# Patient Record
Sex: FEMALE | Race: WHITE | NOT HISPANIC OR LATINO | Employment: OTHER | URBAN - METROPOLITAN AREA
[De-identification: names, ages, dates, MRNs, and addresses within clinical notes are randomized per-mention and may not be internally consistent; named-entity substitution may affect disease eponyms.]

---

## 2017-01-01 DIAGNOSIS — Z12.31 ENCOUNTER FOR SCREENING MAMMOGRAM FOR MALIGNANT NEOPLASM OF BREAST: ICD-10-CM

## 2017-01-01 DIAGNOSIS — M79.629 PAIN OF UPPER ARM: ICD-10-CM

## 2017-01-13 ENCOUNTER — ALLSCRIPTS OFFICE VISIT (OUTPATIENT)
Dept: OTHER | Facility: OTHER | Age: 57
End: 2017-01-13

## 2017-01-13 ENCOUNTER — LAB REQUISITION (OUTPATIENT)
Dept: LAB | Facility: HOSPITAL | Age: 57
End: 2017-01-13
Payer: COMMERCIAL

## 2017-01-13 DIAGNOSIS — Z01.419 ENCOUNTER FOR GYNECOLOGICAL EXAMINATION WITHOUT ABNORMAL FINDING: ICD-10-CM

## 2017-01-13 PROCEDURE — 87624 HPV HI-RISK TYP POOLED RSLT: CPT | Performed by: PHYSICIAN ASSISTANT

## 2017-01-13 PROCEDURE — G0145 SCR C/V CYTO,THINLAYER,RESCR: HCPCS | Performed by: PHYSICIAN ASSISTANT

## 2017-01-18 ENCOUNTER — HOSPITAL ENCOUNTER (OUTPATIENT)
Dept: MAMMOGRAPHY | Facility: CLINIC | Age: 57
Discharge: HOME | End: 2017-01-18
Payer: COMMERCIAL

## 2017-01-18 ENCOUNTER — HOSPITAL ENCOUNTER (OUTPATIENT)
Dept: ULTRASOUND IMAGING | Facility: CLINIC | Age: 57
Discharge: HOME | End: 2017-01-18
Payer: COMMERCIAL

## 2017-01-18 DIAGNOSIS — Z12.31 ENCOUNTER FOR SCREENING MAMMOGRAM FOR MALIGNANT NEOPLASM OF BREAST: ICD-10-CM

## 2017-01-18 DIAGNOSIS — N64.4 BREAST PAIN: ICD-10-CM

## 2017-01-18 DIAGNOSIS — M79.629 PAIN OF UPPER ARM: ICD-10-CM

## 2017-01-18 PROCEDURE — G0204 DX MAMMO INCL CAD BI: HCPCS

## 2017-01-18 PROCEDURE — 76642 ULTRASOUND BREAST LIMITED: CPT

## 2017-01-19 LAB
HPV RRNA GENITAL QL NAA+PROBE: NORMAL
LAB AP GYN PRIMARY INTERPRETATION: NORMAL
Lab: NORMAL

## 2017-04-03 ENCOUNTER — LAB REQUISITION (OUTPATIENT)
Dept: LAB | Facility: HOSPITAL | Age: 57
End: 2017-04-03
Payer: COMMERCIAL

## 2017-04-03 DIAGNOSIS — B37.9 CANDIDIASIS: ICD-10-CM

## 2017-04-03 PROCEDURE — 87102 FUNGUS ISOLATION CULTURE: CPT | Performed by: PHYSICIAN ASSISTANT

## 2017-05-08 LAB — FUNGUS SPEC CULT: NORMAL

## 2017-10-19 ENCOUNTER — ALLSCRIPTS OFFICE VISIT (OUTPATIENT)
Dept: OTHER | Facility: OTHER | Age: 57
End: 2017-10-19

## 2017-10-19 DIAGNOSIS — N64.4 MASTODYNIA: ICD-10-CM

## 2017-10-19 DIAGNOSIS — N63.20 MASS OF LEFT BREAST: ICD-10-CM

## 2017-10-23 ENCOUNTER — GENERIC CONVERSION - ENCOUNTER (OUTPATIENT)
Dept: OTHER | Facility: OTHER | Age: 57
End: 2017-10-23

## 2017-10-23 ENCOUNTER — HOSPITAL ENCOUNTER (OUTPATIENT)
Dept: MAMMOGRAPHY | Facility: CLINIC | Age: 57
Discharge: HOME | End: 2017-10-23
Payer: COMMERCIAL

## 2017-10-23 ENCOUNTER — HOSPITAL ENCOUNTER (OUTPATIENT)
Dept: ULTRASOUND IMAGING | Facility: CLINIC | Age: 57
Discharge: HOME | End: 2017-10-23
Payer: COMMERCIAL

## 2017-10-23 DIAGNOSIS — N64.4 MASTODYNIA: ICD-10-CM

## 2017-10-23 DIAGNOSIS — N63.20 MASS OF LEFT BREAST: ICD-10-CM

## 2017-10-23 PROCEDURE — G0206 DX MAMMO INCL CAD UNI: HCPCS

## 2017-10-23 PROCEDURE — 76642 ULTRASOUND BREAST LIMITED: CPT

## 2017-10-23 PROCEDURE — G0279 TOMOSYNTHESIS, MAMMO: HCPCS

## 2017-10-26 NOTE — PROGRESS NOTES
Assessment  1  Breast pain, left (111 71) (N64 4)   2  Left breast lump (611 72) (N63 20)    Plan  Breast pain, left, Left breast lump    · MAMMO DIAGNOSTIC LEFT W CAD; Status:Hold For - Scheduling; Requested  YZT:[id number];    Perform:[facility] Radiology; Order Comments:LEFT BREAST: AREA OF TENDERNESS AND CYSTIC TISSUE AT 5 OCLOCK ADJACENT TO AREOLAWITH U/S AS INDICATED; Due:19Oct2018; Ordered; For:Breast pain, left, Left breast lump; Ordered By:Neda Reyes;    Discussion/Summary  Discussion Summary:   Unremarkable CBE w/ tenderness and cystic patch in area of concern  patient and still recommend clinical correlation with imaging  agrees and was given order for diagnostic mammo +/- US of left breast    GYN Discussion and Summary:                 Breast Lump--  Goals and Barriers: The patient has the current Goals: Evaluation of breast pain/ cystic area  The patent has the current Barriers: None  Patient's Capacity to Self-Care: Patient is able to Self-Care  Medication SE Review and Pt Understands Tx: The treatment plan was reviewed with the patient/guardian  The patient/guardian understands and agrees with the treatment plan   Self Referrals:   Self Referrals: No      Chief Complaint  Chief Complaint Free Text Note Form: ACUTE EXAM states when she did her self breast exam this morning she notice a lump on her left breast      History of Present Illness  HPI: 58 yo female presents for problem visit  breast problem  having done a SBE this am, thought she felt an lump in an area of tenderness in her left breast yearly with NC 1/2017 was c/o right axillary tenderness & lump  bilateral diagnostic mammo & US were negative at that time  [age], age at first birth 29use -- 3 yearsHRThx of FCB; hx of cyst aspirationhx: negative for breast uterine ovarian or colon cancer         Review of Systems  Focused-Female:   Constitutional: no fever-- and-- no chills     Breasts: breast pain,-- breast lump-- and-- Left breast, but-- no breast swelling,-- no reddening of the breast,-- no breast itching-- and-- no nipple discharge  ROS Reviewed:   ROS reviewed  Active Problems  1  Axillary pain (729 5) (M79 629)   2  Congenital Hypothyroidism (243)   3  Encounter for routine gynecological examination (V72 31) (Z01 419)   4  Pap smear, as part of routine gynecological examination (V76 2) (Z01 419)   5  Sprained Ribs (848 3)   6  Visit for screening mammogram (V76 12) (Z12 31)   7  Vitamin D deficiency (268 9) (E55 9)   8  Vulvar itching (698 1) (L29 2)    Past Medical History  1  History of Chronic cervicitis (616 0) (N72)   2  History of Denial Of Any Significant Medical History   3  History of Encounter for routine gynecological examination (V72 31) (Z01 419)   4  Denied: History of  3   5  History of human papillomavirus infection (V12 09) (Z86 19)   6  History of Papanicolaou smear (V45 89) (Z98 890)   7  History of Pap smear, as part of routine gynecological examination (V76 2) (Z01 419)   8  History of Syncope and collapse (780 2) (R55)   9  History of Visit for screening mammogram (N47 51) (Z12 31)  Active Problems And Past Medical History Reviewed: The active problems and past medical history were reviewed and updated today  Surgical History  1  History of Colposcopy   2  History of Dilation And Curettage   3  History of Gallbladder Surgery   4  History of Tubal Ligation  Surgical History Reviewed: The surgical history was reviewed and updated today  Family History  Father    1  Family history of diabetes mellitus (V18 0) (Z83 3)  Family History    2  Family history of Denial Of Any Significant Medical History  Family History Reviewed: The family history was reviewed and updated today  Social History   · Denied: History of Alcohol Use (History)   · Denied: History of Drug Use   · Denied: History of Home environment domestic violence   · Never A Smoker  Social History Reviewed:  The social history was reviewed and updated today  Current Meds   1  Amoxicillin-Pot Clavulanate 875-125 MG Oral Tablet; Therapy: 51FHA9333 to (Evaluate:04Zoa2962) Recorded   2  Calcium Carbonate-Vitamin D 600-400 MG-UNIT Oral Tablet; Therapy: (Solo Sanjana) to Recorded   3  Fluticasone Propionate 50 MCG/ACT Nasal Suspension; Therapy: 89VSZ2708 to (Jacqui Tavarez) Recorded   4  Levothyroxine Sodium 75 MCG Oral Tablet; Therapy: 42SNX8002 to (Evaluate:08Oct2015) Recorded   5  Synthroid 100 MCG Oral Tablet; Therapy: (Ayleen Sanjana) to Recorded   6  Topicort 0 05 % External Cream; APPLY AS DIRECTED; Therapy: 33EJN1225 to (Last Rx:08Sep2015)  Requested for: 08Sep2015 Ordered   7  Vitamin B Complex CAPS; Therapy: (Recorded:64Kui1103) to Recorded  Medication List Reviewed: The medication list was reviewed and updated today  Allergies  1  albuterol   2  Neosporin OINT  3  Bee sting   4  Latex    Vitals  Vital Signs    Recorded: 33AWF4136 70:99XA   Systolic 065   Diastolic 64   Height 5 ft 2 5 in   Weight 173 lb    BMI Calculated 31 14   BSA Calculated 1 81     Physical Exam    Constitutional   General appearance: No acute distress, well appearing and well nourished  Chest   Breasts: Abnormal  -- LEFT: area of concern to patient at 5 oclock adjacent to areolar border, + tenderness, fine fibrocystic changes noted; RIGHT- normal    Lymphatic   Palpation of lymph nodes in neck, axillae, groin and/or other locations: No lymphadenopathy or masses noted         Signatures   Electronically signed by : Adriana Jay NP; Oct 19 2017  5:53PM EST                       (Author)    Electronically signed by : DONALD Diza ; Oct 25 2017 10:04PM EST                       (Author)

## 2018-01-09 NOTE — MISCELLANEOUS
Message  Spoke to patient about diagnostic mammo and US of left breast for evaluation of her pain/cystic area for which she was seen on 10/19/17  Advised that the imaging was negative  Advised to monitor breasts w/ SBE and to f/u should any changes occur  Patient expressed understanding        Signatures   Electronically signed by : Néstor Daugherty NP; Oct 23 2017  3:44PM EST                       (Author)

## 2018-01-13 VITALS
SYSTOLIC BLOOD PRESSURE: 102 MMHG | WEIGHT: 173 LBS | DIASTOLIC BLOOD PRESSURE: 64 MMHG | HEIGHT: 63 IN | BODY MASS INDEX: 30.65 KG/M2

## 2018-01-14 VITALS
DIASTOLIC BLOOD PRESSURE: 64 MMHG | WEIGHT: 181 LBS | BODY MASS INDEX: 32.07 KG/M2 | SYSTOLIC BLOOD PRESSURE: 110 MMHG | HEIGHT: 63 IN

## 2018-05-25 ENCOUNTER — OFFICE VISIT (OUTPATIENT)
Dept: VASCULAR SURGERY | Facility: CLINIC | Age: 58
End: 2018-05-25
Payer: COMMERCIAL

## 2018-05-25 VITALS
BODY MASS INDEX: 29.63 KG/M2 | WEIGHT: 161 LBS | TEMPERATURE: 97.4 F | HEIGHT: 62 IN | RESPIRATION RATE: 16 BRPM | HEART RATE: 74 BPM | SYSTOLIC BLOOD PRESSURE: 118 MMHG | DIASTOLIC BLOOD PRESSURE: 74 MMHG

## 2018-05-25 DIAGNOSIS — I87.2 CHRONIC VENOUS INSUFFICIENCY: Primary | ICD-10-CM

## 2018-05-25 DIAGNOSIS — M79.605 LEFT LEG PAIN: ICD-10-CM

## 2018-05-25 DIAGNOSIS — Z86.718 HISTORY OF DVT OF LOWER EXTREMITY: ICD-10-CM

## 2018-05-25 DIAGNOSIS — D68.51 FACTOR V LEIDEN (HCC): ICD-10-CM

## 2018-05-25 PROBLEM — I95.9 HYPOTENSION: Status: ACTIVE | Noted: 2018-05-25

## 2018-05-25 PROCEDURE — 99203 OFFICE O/P NEW LOW 30 MIN: CPT | Performed by: NURSE PRACTITIONER

## 2018-05-25 RX ORDER — B-COMPLEX WITH VITAMIN C
TABLET ORAL
COMMUNITY

## 2018-05-25 RX ORDER — FLUTICASONE PROPIONATE 50 MCG
SPRAY, SUSPENSION (ML) NASAL
COMMUNITY
Start: 2014-04-15

## 2018-05-25 RX ORDER — ASPIRIN 81 MG/1
81 TABLET ORAL DAILY
Qty: 30 TABLET | Refills: 0 | Status: SHIPPED | OUTPATIENT
Start: 2018-05-25

## 2018-05-25 RX ORDER — MULTIVITAMIN WITH IRON
TABLET ORAL
COMMUNITY
Start: 2014-04-15

## 2018-05-25 RX ORDER — LEVOTHYROXINE SODIUM 0.07 MG/1
75 TABLET ORAL
COMMUNITY
Start: 2014-04-15

## 2018-05-25 NOTE — PROGRESS NOTES
Assessment/Plan:  26-year-old female with history of left lower extremity DVT '05 (unprovoked) and factor 5 Leiden who presents to the office for evaluation of dull aching discomfort of the left calf times 2-3 months   -Suspect this is chronic venous insufficiency related to her prior history of DVT  -Will evaluate with venous Doppler to rule out acute DVT  -Rx 20-30 mmHg compression given  Instructed to wear these daily  Removed at night  She will get size and fitted  -She should also elevate periodically to help manage discomfort and continue to be active/exercise routinely  -Aspirin 81 mg daily for cardiovascular health  -Return to the office in 3 months to check her symptoms with the use of compression  Problem List Items Addressed This Visit        Cardiovascular and Mediastinum    Chronic venous insufficiency - Primary    Relevant Orders    Compression Stocking       Hematopoietic and Hemostatic    Factor V Leiden ([facility] [address] )    Relevant Orders    VAS lower limb venous duplex study, unilateral/limited       Other    History of DVT of lower extremity    Relevant Medications    aspirin (ECOTRIN LOW STRENGTH) 81 mg EC tablet    Other Relevant Orders    VAS lower limb venous duplex study, unilateral/limited    Left leg pain    Relevant Orders    VAS lower limb venous duplex study, unilateral/limited            Subjective: " I am here for Leg pain" Patient new to our practice she complains of dull achy pain in the left leg  She has a hx of blood clots in left leg  Patient ID: Juan Parada is a 59 y o  female  HPI  26-year-old female who presents for evaluation of left lower extremity discomfort over the last few months  She has pain and discomfort to left anterior shin lateral calf when sitting for long periods of time and at the end of the day while lying in bed which keeps her from going to sleep  She had a left lower extremity DVT in 2005 which a seems to be unprovoked    She recalls she woke suddenly in the night with excruciating pain in left leg and blacked out then diagnosed with DVT - she does not recall level of DVT though points to calf  She was on Coumadin for approximately a year and then transitioned to aspirin 81 mg  After a few years she stop taking aspirin  Her daughter was diagnosed with bilateral pulmonary embolism 3 years ago found to have factor 5 Leiden  Patient was tested herself as well and also has factor 5 Leiden  She is not sure of the heterozygous or homozygous  She has not seen hematologist   This was in the last 3 years  There is no record to review on [facility]'s system or Care everywhere  She did have an LEV 6/2015 which was negative for acute or chronic DVT  She is active  She hikes often  She ran a half marathon in [place] one month ago  She did drive to [place] which was a 13-14 hour car ride and she reports stopping multiple times  She denies any chest pain palpitations or SOB  She presently has no swelling though intermittently has swelling of the left leg  She has occasional discomfort in the right leg though not concerning or comparable to left leg  The following portions of the patient's history were reviewed and updated as appropriate: allergies, current medications, past family history, past medical history, past social history, past surgical history and problem list     Review of Systems   Constitutional: Negative  HENT: Positive for sinus pressure  Eyes: Negative  Respiratory: Negative  Cardiovascular: Negative  Gastrointestinal: Negative  Endocrine: Negative  Genitourinary: Negative  Musculoskeletal: Negative  Skin: Negative  Allergic/Immunologic: Negative  Neurological: Positive for light-headedness and headaches  Hematological: Negative  Factor 5 leiden    Psychiatric/Behavioral: Negative            Objective:  Vitals:    05/25/18 0910   BP: 118/74   BP Location: Right arm   Patient Position: Sitting Pulse: 74   Resp: 16   Temp: (!) 97 4 °F (36 3 °C)   TempSrc: Tympanic   Weight: 73 kg (161 lb)   Height: 5' 2" (1 575 m)       Patient Active Problem List   Diagnosis    Chronic venous insufficiency    Factor V Leiden ([address] )    History of DVT of lower extremity    Hypotension    Left leg pain       Past Surgical History:   Procedure Laterality Date    DILATION AND CURETTAGE OF UTERUS      GALLBLADDER SURGERY      TUBAL LIGATION         Family History   Problem Relation Age of Onset    Diabetes Father     Cancer Sister     Fibromyalgia Sister        Social History     Social History    Marital status: /Civil Union     Spouse name: N/A    Number of children: N/A    Years of education: N/A     Occupational History    Not on file  Social History Main Topics    Smoking status: Never Smoker    Smokeless tobacco: Never Used    Alcohol use No    Drug use: No    Sexual activity: Not on file     Other Topics Concern    Not on file     Social History Narrative    No narrative on file       Allergies   Allergen Reactions    Bee Venom Anaphylaxis    Docosanol Hives     (THIS MEDICATION ALSO KNOWN AS ABREVA)    Latex Rash    Neomycin-Bacitracin Zn-Polymyx Hives and Rash         Current Outpatient Prescriptions:     Acetaminophen 325 MG CAPS, 500mg prn, Disp: , Rfl:     Cholecalciferol 1000 units capsule, Take 3,000 Units by mouth, Disp: , Rfl:     fluticasone (FLONASE) 50 mcg/act nasal spray, into each nostril, Disp: , Rfl:     levothyroxine 75 mcg tablet, Take 75 mcg by mouth, Disp: , Rfl:     pyridoxine (VITAMIN B6) 100 mg tablet, daily, Disp: , Rfl:     VITAMIN B COMPLEX-C CAPS, Take by mouth, Disp: , Rfl:     aspirin (ECOTRIN LOW STRENGTH) 81 mg EC tablet, Take 1 tablet (81 mg total) by mouth daily, Disp: 30 tablet, Rfl: 0           Physical Exam   Constitutional: She is oriented to person, place, and time  She appears well-developed  HENT:   Head: Normocephalic and atraumatic  Eyes: EOM are normal    Cardiovascular: Normal heart sounds  Pulmonary/Chest: Breath sounds normal    Abdominal: Bowel sounds are normal    Neurological: She is alert and oriented to person, place, and time  Skin: Skin is warm and dry  Psychiatric: She has a normal mood and affect  Her behavior is normal  Judgment and thought content normal    Nursing note and vitals reviewed

## 2018-05-25 NOTE — LETTER
May 25, 2018     DO Teja Bo[address] [place] [id number]    Patient: Aden Desai   YOB: 1960   Date of Visit: 5/25/2018       Dear Dr Elian Hammond: Thank you for referring Aden Desai to me for evaluation  Below are my notes for this consultation  If you have questions, please do not hesitate to call me  I look forward to following your patient along with you  Sincerely,        VALDEMAR Vail        CC: No Recipients  Logan Vail  5/25/2018 10:34 AM  Sign at close encounter  Assessment/Plan:  58-year-old female with history of left lower extremity DVT '05 (unprovoked) and factor 5 Leiden who presents to the office for evaluation of dull aching discomfort of the left calf times 2-3 months   -Suspect this is chronic venous insufficiency related to her prior history of DVT  -Will evaluate with venous Doppler to rule out acute DVT  -Rx 20-30 mmHg compression given  Instructed to wear these daily  Removed at night  She will get size and fitted  -She should also elevate periodically to help manage discomfort and continue to be active/exercise routinely  -Aspirin 81 mg daily for cardiovascular health  -Return to the office in 3 months to check her symptoms with the use of compression           Problem List Items Addressed This Visit        Cardiovascular and Mediastinum    Chronic venous insufficiency - Primary    Relevant Orders    Compression Stocking       Hematopoietic and Hemostatic    Factor V Leiden ([address] )    Relevant Orders    VAS lower limb venous duplex study, unilateral/limited       Other    History of DVT of lower extremity    Relevant Medications    aspirin (ECOTRIN LOW STRENGTH) 81 mg EC tablet    Other Relevant Orders    VAS lower limb venous duplex study, unilateral/limited    Left leg pain    Relevant Orders    VAS lower limb venous duplex study, unilateral/limited            Subjective: " I am here for Leg pain" Patient new to our practice she complains of dull achy pain in the left leg  She has a hx of blood clots in left leg  Patient ID: Edilberto Branham is a 59 y o  female  HPI  18-year-old female who presents for evaluation of left lower extremity discomfort over the last few months  She has pain and discomfort to left anterior shin lateral calf when sitting for long periods of time and at the end of the day while lying in bed which keeps her from going to sleep  She had a left lower extremity DVT in 2005 which a seems to be unprovoked  She recalls she woke suddenly in the night with excruciating pain in left leg and blacked out then diagnosed with DVT - she does not recall level of DVT though points to calf  She was on Coumadin for approximately a year and then transitioned to aspirin 81 mg  After a few years she stop taking aspirin  Her daughter was diagnosed with bilateral pulmonary embolism 3 years ago found to have factor 5 Leiden  Patient was tested herself as well and also has factor 5 Leiden  She is not sure of the heterozygous or homozygous  She has not seen hematologist   This was in the last 3 years  There is no record to review on [facility] system or Care everywhere  She did have an LEV 6/2015 which was negative for acute or chronic DVT  She is active  She hikes often  She ran a half marathon in [place] one month ago  She did drive to [place] which was a 13-14 hour car ride and she reports stopping multiple times  She denies any chest pain palpitations or SOB  She presently has no swelling though intermittently has swelling of the left leg  She has occasional discomfort in the right leg though not concerning or comparable to left leg     The following portions of the patient's history were reviewed and updated as appropriate: allergies, current medications, past family history, past medical history, past social history, past surgical history and problem list     Review of Systems   Constitutional: Negative  HENT: Positive for sinus pressure  Eyes: Negative  Respiratory: Negative  Cardiovascular: Negative  Gastrointestinal: Negative  Endocrine: Negative  Genitourinary: Negative  Musculoskeletal: Negative  Skin: Negative  Allergic/Immunologic: Negative  Neurological: Positive for light-headedness and headaches  Hematological: Negative  Factor 5 leiden    Psychiatric/Behavioral: Negative  Objective:  Vitals:    05/25/18 0910   BP: 118/74   BP Location: Right arm   Patient Position: Sitting   Pulse: 74   Resp: 16   Temp: (!) 97 4 °F (36 3 °C)   TempSrc: Tympanic   Weight: 73 kg (161 lb)   Height: 5' 2" (1 575 m)       Patient Active Problem List   Diagnosis    Chronic venous insufficiency    Factor V Leiden ([address] )    History of DVT of lower extremity    Hypotension    Left leg pain       Past Surgical History:   Procedure Laterality Date    DILATION AND CURETTAGE OF UTERUS      GALLBLADDER SURGERY      TUBAL LIGATION         Family History   Problem Relation Age of Onset    Diabetes Father     Cancer Sister     Fibromyalgia Sister        Social History     Social History    Marital status: /Civil Union     Spouse name: N/A    Number of children: N/A    Years of education: N/A     Occupational History    Not on file       Social History Main Topics    Smoking status: Never Smoker    Smokeless tobacco: Never Used    Alcohol use No    Drug use: No    Sexual activity: Not on file     Other Topics Concern    Not on file     Social History Narrative    No narrative on file       Allergies   Allergen Reactions    Bee Venom Anaphylaxis    Docosanol Hives     (THIS MEDICATION ALSO KNOWN AS ABREVA)    Latex Rash    Neomycin-Bacitracin Zn-Polymyx Hives and Rash         Current Outpatient Prescriptions:     Acetaminophen 325 MG CAPS, 500mg prn, Disp: , Rfl:     Cholecalciferol 1000 units capsule, Take 3,000 Units by mouth, Disp: , Rfl:    fluticasone (FLONASE) 50 mcg/act nasal spray, into each nostril, Disp: , Rfl:     levothyroxine 75 mcg tablet, Take 75 mcg by mouth, Disp: , Rfl:     pyridoxine (VITAMIN B6) 100 mg tablet, daily, Disp: , Rfl:     VITAMIN B COMPLEX-C CAPS, Take by mouth, Disp: , Rfl:     aspirin (ECOTRIN LOW STRENGTH) 81 mg EC tablet, Take 1 tablet (81 mg total) by mouth daily, Disp: 30 tablet, Rfl: 0           Physical Exam   Constitutional: She is oriented to person, place, and time  She appears well-developed  HENT:   Head: Normocephalic and atraumatic  Eyes: EOM are normal    Cardiovascular: Normal heart sounds  Pulmonary/Chest: Breath sounds normal    Abdominal: Bowel sounds are normal    Neurological: She is alert and oriented to person, place, and time  Skin: Skin is warm and dry  Psychiatric: She has a normal mood and affect  Her behavior is normal  Judgment and thought content normal    Nursing note and vitals reviewed

## 2018-05-25 NOTE — LETTER
May 25, 2018     DO Teja [facility] [address]    Patient: Edilberto Branham   YOB: 1960   Date of Visit: 5/25/2018       Dear Dr Cindy Reyez: Thank you for referring Edilberto Branham to me for evaluation  Below are my notes for this consultation  If you have questions, please do not hesitate to call me  I look forward to following your patient along with you           Sincerely,        VALDEMAR Keller        CC: No Recipients

## 2018-05-25 NOTE — PATIENT INSTRUCTIONS
49-year-old female with history of left lower extremity DVT '05 (unprovoked) and factor 5 Leiden who presents to the office for evaluation of dull aching discomfort of the left calf times 2-3 months   -Suspect this is chronic venous insufficiency related to her prior history of DVT  -Will evaluate with venous Doppler to rule out acute DVT  -Rx 20-30 mmHg compression given  Instructed to wear these daily  Removed at night  She will get size and fitted  -She should also elevate periodically to help manage discomfort and continue to be active/exercise routinely  -Aspirin 81 mg daily for cardiovascular health  -Return to the office in 3 months to check her symptoms with the use of compression

## 2018-06-04 ENCOUNTER — HOSPITAL ENCOUNTER (OUTPATIENT)
Dept: NON INVASIVE DIAGNOSTICS | Facility: CLINIC | Age: 58
Discharge: HOME | End: 2018-06-04
Payer: COMMERCIAL

## 2018-06-04 DIAGNOSIS — M79.605 LEFT LEG PAIN: ICD-10-CM

## 2018-06-04 DIAGNOSIS — D68.51 FACTOR V LEIDEN (HCC): ICD-10-CM

## 2018-06-04 DIAGNOSIS — Z86.718 HISTORY OF DVT OF LOWER EXTREMITY: ICD-10-CM

## 2018-06-04 PROCEDURE — 93971 EXTREMITY STUDY: CPT

## 2018-06-04 PROCEDURE — 93971 EXTREMITY STUDY: CPT | Performed by: SURGERY

## 2018-11-28 ENCOUNTER — ANNUAL EXAM (OUTPATIENT)
Dept: OBGYN CLINIC | Facility: CLINIC | Age: 58
End: 2018-11-28
Payer: COMMERCIAL

## 2018-11-28 VITALS — BODY MASS INDEX: 32.01 KG/M2 | WEIGHT: 175 LBS | SYSTOLIC BLOOD PRESSURE: 110 MMHG | DIASTOLIC BLOOD PRESSURE: 74 MMHG

## 2018-11-28 DIAGNOSIS — Z01.419 ENCOUNTER FOR GYNECOLOGICAL EXAMINATION WITHOUT ABNORMAL FINDING: Primary | ICD-10-CM

## 2018-11-28 DIAGNOSIS — Z12.31 VISIT FOR SCREENING MAMMOGRAM: ICD-10-CM

## 2018-11-28 PROCEDURE — S0612 ANNUAL GYNECOLOGICAL EXAMINA: HCPCS | Performed by: PHYSICIAN ASSISTANT

## 2018-11-28 PROCEDURE — G0124 SCREEN C/V THIN LAYER BY MD: HCPCS | Performed by: PATHOLOGY

## 2018-11-28 PROCEDURE — G0145 SCR C/V CYTO,THINLAYER,RESCR: HCPCS | Performed by: PATHOLOGY

## 2018-11-28 NOTE — PROGRESS NOTES
Assessment/Plan:    No problem-specific Assessment & Plan notes found for this encounter  Diagnoses and all orders for this visit:    Encounter for gynecological examination without abnormal finding  -     Liquid-based pap, screening    Visit for screening mammogram  -     Mammo screening bilateral w cad; Future        Pap done  Order for mammogram entered  No masses appreciated on L breast exam   Recommended patient to make sure she is getting 1200 mg of calcium daily  Discussed remedies for vaginal dryness including silicone based lubricant, coconut oil, and vaginal estrogen cream   Patient states she will think about it  Encouraged her to continue levothyroxine as prescribed  Can refer to women's health PT if urine leakage becomes bothersome  If no problems, patient to return in 1 year for routine gyn care  Subjective:      Patient ID: Scott Mitchell is a 59 y o  female  Patient is here for yearly gyn exam   States she is doing well overall  Was started on levothyroxine by her PCP  Patient stopped taking medication because it made her gain weight  Has since restarted med, and weight is now stable  She complains of mild urine leakage that requires her to wear a pantiliner  Also notes vaginal dryness  Patient denies any change in bowel/bladder habits, pelvic pain, bleeding, bloating, abdominal pain, n/v, and change in appetite  She is taking Vitamin D, but no extra calcium  She is up to date on her colonoscopy  Patient is overdue for mammogram   Still complains of chronic tenderness in L breast   U/S last year was negative  She denies any new masses, skin changes, and nipple discharge  Drinks 1-2 cups of coffee a day          The following portions of the patient's history were reviewed and updated as appropriate: allergies, current medications, past family history, past medical history, past social history, past surgical history and problem list     Review of Systems   Constitutional: Negative for appetite change and unexpected weight change  Cardiovascular:        No masses, skin changes, nipple discharge  Chronic L breast tenderness  Gastrointestinal: Negative for abdominal distention, abdominal pain, constipation, diarrhea, nausea and vomiting  Genitourinary: Positive for vaginal pain (Dryness)  Negative for difficulty urinating, dysuria, frequency, genital sores, hematuria, menstrual problem, pelvic pain, urgency, vaginal bleeding and vaginal discharge  Objective:      /74   Wt 79 4 kg (175 lb)   BMI 32 01 kg/m²          Physical Exam   Constitutional: She is oriented to person, place, and time  Vital signs are normal  She appears well-developed and well-nourished  Neck: No thyromegaly present  Cardiovascular: Normal rate, regular rhythm and normal heart sounds  Exam reveals no gallop and no friction rub  No murmur heard  Pulmonary/Chest: Effort normal and breath sounds normal  Right breast exhibits no inverted nipple, no mass, no nipple discharge, no skin change and no tenderness  Left breast exhibits tenderness  Left breast exhibits no inverted nipple, no mass, no nipple discharge and no skin change  Breasts are symmetrical    Fibrocystic change bilaterally  Abdominal: Soft  Normal appearance and bowel sounds are normal  She exhibits no distension  There is no tenderness  Genitourinary: Rectum normal, vagina normal and uterus normal  Rectal exam shows anal tone normal and guaiac negative stool  No breast swelling, tenderness, discharge or bleeding  No labial fusion  There is no rash, tenderness, lesion or injury on the right labia  There is no rash, tenderness, lesion or injury on the left labia  Cervix exhibits no motion tenderness, no discharge and no friability  Right adnexum displays no mass, no tenderness and no fullness  Left adnexum displays no mass, no tenderness and no fullness  No erythema, tenderness or bleeding in the vagina   No vaginal discharge found  Lymphadenopathy:     She has no cervical adenopathy  Right: No inguinal adenopathy present  Left: No inguinal adenopathy present  Neurological: She is alert and oriented to person, place, and time  Skin: Skin is warm and dry  Psychiatric: She has a normal mood and affect  Her behavior is normal  Judgment and thought content normal    Vitals reviewed

## 2018-11-28 NOTE — PATIENT INSTRUCTIONS
Go for mammogram   Consider remedies for vaginal dryness  Can have referral to women's health PT if needed  Make sure to get 1200 mg calcium daily

## 2018-12-06 ENCOUNTER — TELEPHONE (OUTPATIENT)
Dept: OBGYN CLINIC | Facility: CLINIC | Age: 58
End: 2018-12-06

## 2018-12-06 LAB
LAB AP GYN PRIMARY INTERPRETATION: NORMAL
Lab: NORMAL
PATH INTERP SPEC-IMP: NORMAL

## 2018-12-06 NOTE — TELEPHONE ENCOUNTER
Spoke with patient regarding Pap results - LSIL with rare cells suspicious for HSIL  Patient states she had an abnormal many years ago; she thinks it was ASCUS  Did have a colposcopy at that time  Need to repeat colposcopy; she will schedule appointment today

## 2019-01-08 ENCOUNTER — PROCEDURE VISIT (OUTPATIENT)
Dept: OBGYN CLINIC | Facility: CLINIC | Age: 59
End: 2019-01-08
Payer: COMMERCIAL

## 2019-01-08 ENCOUNTER — HOSPITAL ENCOUNTER (OUTPATIENT)
Dept: RADIOLOGY | Age: 59
Discharge: HOME | End: 2019-01-08
Payer: COMMERCIAL

## 2019-01-08 VITALS
WEIGHT: 171 LBS | BODY MASS INDEX: 31.47 KG/M2 | DIASTOLIC BLOOD PRESSURE: 68 MMHG | HEIGHT: 62 IN | SYSTOLIC BLOOD PRESSURE: 124 MMHG

## 2019-01-08 VITALS — BODY MASS INDEX: 30.73 KG/M2 | WEIGHT: 167 LBS | HEIGHT: 62 IN

## 2019-01-08 DIAGNOSIS — Z12.31 VISIT FOR SCREENING MAMMOGRAM: ICD-10-CM

## 2019-01-08 DIAGNOSIS — R87.612 LOW GRADE SQUAMOUS INTRAEPITHELIAL LESION (LGSIL) AT RISK FOR HIGH GRADE SQUAMOUS INTRAEPITHELIAL LESION (HGSIL) ON CYTOLOGIC SMEAR OF CERVIX: Primary | ICD-10-CM

## 2019-01-08 PROCEDURE — 88305 TISSUE EXAM BY PATHOLOGIST: CPT | Performed by: PATHOLOGY

## 2019-01-08 PROCEDURE — 77067 SCR MAMMO BI INCL CAD: CPT

## 2019-01-08 PROCEDURE — 77063 BREAST TOMOSYNTHESIS BI: CPT

## 2019-01-08 PROCEDURE — 57454 BX/CURETT OF CERVIX W/SCOPE: CPT | Performed by: OBSTETRICS & GYNECOLOGY

## 2019-01-08 RX ORDER — VALACYCLOVIR HYDROCHLORIDE 1 G/1
1000 TABLET, FILM COATED ORAL EVERY 12 HOURS PRN
Refills: 2 | COMMUNITY
Start: 2018-12-13

## 2019-01-08 NOTE — PROGRESS NOTES
Colposcopy  Date/Time: 1/8/2019 11:44 AM  Performed by: Harshad Duncan  Authorized by: Harshad Duncan     Consent:     Consent obtained:  Verbal    Consent given by:  Patient    Procedural risks discussed:  Bleeding    Patient questions answered: yes      Patient agrees, verbalizes understanding, and wants to proceed: yes      Educational handouts given: no      Instructions and paperwork completed: yes    Universal protocol:     Patient states understanding of procedure being performed: yes      Relevant documents present and verified: yes      Test results available and properly labeled: yes      Imaging studies available: no      Required blood products, implants, devices, and special equipment available: yes      Site marked: no    Pre-procedure:     Pre-procedure timeout performed: yes      Prepped with: acetic acid    Indication:     Indication:  LSIL (Rare cells cannot rule out High grade changes)  Procedure:     Procedure: Colposcopy w/ cervical biopsy and ECC      Under satisfactory analgesia the patient was prepped and draped in the dorsal lithotomy position: yes      Oriska speculum was placed in the vagina: yes      Under colposcopic examination the transition zone was seen in entirety: yes      Intracervical block was performed: no      Endocervix was curetted using a Kevorkian curette: yes      Cervical biopsy performed with a cervical biopsy punch: yes      Tampon inserted: no      Monsel's solution was applied: yes      Biopsy(s): yes      Location:  1 o'clock    Specimen to pathology: yes    Post-procedure:     Findings: Mosaicism and White epithelium      Impression: Low grade cervical dysplasia    Comments:      Topic:  Pap smear with LGSIL and rare cells cannot rule out high-grade changes    Colposcopy performed without difficulty and biopsy taken at 1 o'clock as well as endocervical curettage    Excellent hemostasis with Monsel's achieved    Instructions and restrictions given to the patient    Further treatment will be based upon final pathology results    Patient will call should any problems issues or concerns arise

## 2019-01-08 NOTE — PATIENT INSTRUCTIONS
Topic:  Pap smear with LGSIL and rare cells can't rule out high-grade changes    Colposcopy performed without difficulty and biopsy taken as well as endocervical curettage    Excellent hemostasis achieved with Monsel's solution    Instructions and restrictions given to the patient    Further treatment will be based upon final pathology results    Patient will call should any problems issues or concerns arise

## 2019-01-11 ENCOUNTER — TELEPHONE (OUTPATIENT)
Dept: OBGYN CLINIC | Facility: CLINIC | Age: 59
End: 2019-01-11

## 2019-01-11 NOTE — TELEPHONE ENCOUNTER
----- Message from Sasha Torres MD sent at 1/10/2019  1:20 PM EST -----  Biopsy showed very mild inflammatory changes    No cancer or precancerous changes noted at all    Will follow up with routine Pap smear at her annual examination next year    Thanks